# Patient Record
Sex: FEMALE | Race: BLACK OR AFRICAN AMERICAN | NOT HISPANIC OR LATINO | URBAN - METROPOLITAN AREA
[De-identification: names, ages, dates, MRNs, and addresses within clinical notes are randomized per-mention and may not be internally consistent; named-entity substitution may affect disease eponyms.]

---

## 2023-09-26 ENCOUNTER — EMERGENCY (EMERGENCY)
Facility: HOSPITAL | Age: 33
LOS: 1 days | Discharge: HOME | End: 2023-09-26
Attending: STUDENT IN AN ORGANIZED HEALTH CARE EDUCATION/TRAINING PROGRAM
Payer: MEDICAID

## 2023-09-26 VITALS
RESPIRATION RATE: 20 BRPM | OXYGEN SATURATION: 100 % | HEIGHT: 65 IN | WEIGHT: 132.06 LBS | TEMPERATURE: 97 F | DIASTOLIC BLOOD PRESSURE: 87 MMHG | SYSTOLIC BLOOD PRESSURE: 131 MMHG | HEART RATE: 62 BPM

## 2023-09-26 VITALS
TEMPERATURE: 98 F | SYSTOLIC BLOOD PRESSURE: 121 MMHG | HEART RATE: 88 BPM | RESPIRATION RATE: 20 BRPM | OXYGEN SATURATION: 100 % | DIASTOLIC BLOOD PRESSURE: 75 MMHG

## 2023-09-26 PROCEDURE — 99283 EMERGENCY DEPT VISIT LOW MDM: CPT | Mod: 25

## 2023-09-26 PROCEDURE — 96372 THER/PROPH/DIAG INJ SC/IM: CPT

## 2023-09-26 PROCEDURE — 99284 EMERGENCY DEPT VISIT MOD MDM: CPT

## 2023-09-26 RX ORDER — ACETAMINOPHEN 500 MG
975 TABLET ORAL ONCE
Refills: 0 | Status: COMPLETED | OUTPATIENT
Start: 2023-09-26 | End: 2023-09-26

## 2023-09-26 RX ORDER — KETOROLAC TROMETHAMINE 30 MG/ML
30 SYRINGE (ML) INJECTION ONCE
Refills: 0 | Status: DISCONTINUED | OUTPATIENT
Start: 2023-09-26 | End: 2023-09-26

## 2023-09-26 RX ORDER — CYCLOBENZAPRINE HYDROCHLORIDE 10 MG/1
10 TABLET, FILM COATED ORAL ONCE
Refills: 0 | Status: COMPLETED | OUTPATIENT
Start: 2023-09-26 | End: 2023-09-26

## 2023-09-26 RX ORDER — LIDOCAINE 4 G/100G
1 CREAM TOPICAL ONCE
Refills: 0 | Status: COMPLETED | OUTPATIENT
Start: 2023-09-26 | End: 2023-09-26

## 2023-09-26 RX ORDER — CYCLOBENZAPRINE HYDROCHLORIDE 10 MG/1
1 TABLET, FILM COATED ORAL
Qty: 9 | Refills: 0
Start: 2023-09-26 | End: 2023-09-28

## 2023-09-26 RX ADMIN — LIDOCAINE 1 PATCH: 4 CREAM TOPICAL at 15:29

## 2023-09-26 RX ADMIN — CYCLOBENZAPRINE HYDROCHLORIDE 10 MILLIGRAM(S): 10 TABLET, FILM COATED ORAL at 15:28

## 2023-09-26 RX ADMIN — Medication 30 MILLIGRAM(S): at 15:38

## 2023-09-26 RX ADMIN — Medication 975 MILLIGRAM(S): at 15:27

## 2023-09-26 RX ADMIN — Medication 30 MILLIGRAM(S): at 16:08

## 2023-09-26 RX ADMIN — Medication 975 MILLIGRAM(S): at 15:57

## 2023-09-26 NOTE — ED ADULT TRIAGE NOTE - CHIEF COMPLAINT QUOTE
Rt side neck pain, Rt shoulder, Rt arm numbness after stretching and heard a pop 45 mins ago [Negative] : Heme/Lymph

## 2023-09-26 NOTE — ED PROVIDER NOTE - NSFOLLOWUPINSTRUCTIONS_ED_ALL_ED_FT
You were seen in the emergency department for neck pain.    Please follow-up with your primary care doctor within the next 48 hours.     Please take ibuprofen, as prescribed on the box for pain control.    If you have any worsening symptoms, severe headache, dizziness, severe chest pain, trouble breathing, please return to the emergency department. You were seen in the emergency department for neck pain.    Please follow-up with your primary care doctor within the next 48 hours.     You can use 400-600mg Ibuprofen (such as motrin or advil) every 6 to 8 hours as needed for pain control.  Take ibuprofen with food or milk to lessen stomach upset.  This is an over-the-counter medication please respect the warnings on the label. All medications come with certain risks and side effects that you need to discuss with your doctor, especially if you are taking them for a prolonged period.    You can use 500-1000mg Tylenol every 6 hours for pain - as needed.  This is an over-the-counter medications - please respect the warnings on the label. This medication come with certain risks and side effects that you need to discuss with your doctor, especially if you are taking it for a prolonged period.    Muscle relaxants are sent to your pharmacy - this medication can make you drowsy. Please do not drive, use heavy machinery, or use any sharp objects after taking this medication. This medication increases your risk of falling - if you are concerned please only take this medication at night before going to sleep.    If you have any worsening symptoms, severe headache, dizziness, severe chest pain, trouble breathing, please return to the emergency department.    Muscle Strain  A muscle strain, or pulled muscle, happens when a muscle is stretched beyond its normal length. This can tear some muscle fibers and cause pain.    Usually, it takes 1–2 weeks to heal from a muscle strain. Full healing normally takes 5–6 weeks.    What are the causes?  This condition is caused when a sudden force is placed on a muscle and stretches it too far. This can happen with a fall, while lifting, or during sports.    What increases the risk?  You are more likely to develop a muscle strain if you are an athlete or you do a lot of physical activity.    What are the signs or symptoms?  Pain.  Tenderness.  Bruising.  Swelling.  Trouble using the muscle.  How is this treated?  This condition is first treated with PRICE therapy. This involves:  Protecting your muscle from being injured again.  Resting your injured muscle.  Icing your injured muscle.  Putting pressure (compression) on your injured muscle. This may be done with a splint or elastic bandage.  Raising (elevating) your injured muscle.  Your doctor may also recommend medicine for pain.    Follow these instructions at home:  If you have a splint that can be taken off:    Wear the splint as told by your doctor. Take it off only as told by your doctor.  Check the skin around the splint every day. Tell your doctor if you see problems.  Loosen the splint if your fingers or toes:  Tingle.  Become numb.  Turn cold and blue.  Keep the splint clean.  If the splint is not waterproof:  Do not let it get wet.  Cover it with a watertight covering when you take a bath or a shower.  Managing pain, stiffness, and swelling    Bag of ice on a towel on the skin.   If told, put ice on your injured area. To do this:  If you have a removable splint, take it off as told by your doctor.  Put ice in a plastic bag.  Place a towel between your skin and the bag.  Leave the ice on for 20 minutes, 2–3 times a day.  Take off the ice if your skin turns bright red. This is very important. If you cannot feel pain, heat, or cold, you have a greater risk of damage to the area.  Move your fingers or toes often.  Raise the injured area above the level of your heart while you are sitting or lying down.  Wear an elastic bandage as told by your doctor. Make sure it is not too tight.  General instructions    Take over-the-counter and prescription medicines only as told by your doctor. This may include:  Medicines for pain and swelling that are taken by mouth or put on the skin.  Medicines to help relax your muscles.  Limit your activity. Rest your injured muscle as told by your doctor. Your doctor may say that gentle movements are okay.  If physical therapy was prescribed, do exercises as told by your doctor.  Do not put pressure on any part of the splint until it is fully hardened. This may take many hours.  Do not smoke or use any products that contain nicotine or tobacco. If you need help quitting, ask your doctor.  Ask your doctor when it is safe to drive if you have a splint.  Keep all follow-up visits.  How is this prevented?  Warm up before you exercise. This helps to prevent more muscle strains.    Contact a doctor if:  You have more pain or swelling in the injured area.  Get help right away if:  You have any of these problems in your injured area:  Numbness.  Tingling.  Less strength than normal.  Summary  A muscle strain is an injury that happens when a muscle is stretched beyond normal length.  This condition is first treated with PRICE therapy. This includes protecting, resting, icing, adding pressure, and raising your injury.  Limit your activity. Rest your injured muscle as told by your doctor. Your doctor may say that gentle movements are okay.  Warm up before you exercise. This helps to prevent more muscle strains.

## 2023-09-26 NOTE — ED PROVIDER NOTE - PATIENT PORTAL LINK FT
You can access the FollowMyHealth Patient Portal offered by [facility] by registering at the following website: [email or web address]. By joining [employer]’s FollowMyHealth portal, you will also be able to view your health information using other applications (apps) compatible with our system.

## 2023-09-26 NOTE — ED PROVIDER NOTE - CLINICAL SUMMARY MEDICAL DECISION MAKING FREE TEXT BOX
32-year-old female presenting with neck pain.  Full strength in upper extremities.  Symptoms likely secondary to MSK pain versus radiculopathy.  Low concern for bony injury such as fracture as mechanism was low.

## 2023-09-26 NOTE — ED PROVIDER NOTE - OBJECTIVE STATEMENT
32-year-old female, no significant PMH, presenting with neck pain.  Patient reports she had sudden onset neck pain while she was stretching her arms.  Reports she had pain down her neck and had some numbness in her arms.  No weakness or tingling in her arms.  Since then she has not been able to move her neck.

## 2023-09-26 NOTE — ED PROVIDER NOTE - PHYSICAL EXAMINATION
General: uncomfortable appearing female, no acute distress   HEENT: cervical midline spinal tenderness to palpation    Respiratory: normal work of breathing  MSK: no swelling or tenderness of lower extremities, moving all extremities spontaneously   Skin: warm, dry   Neuro: A&Ox3, 5/5 strength in all extremities no pronator drift   Psych: appropriate affect

## 2023-09-26 NOTE — ED PROVIDER NOTE - PROGRESS NOTE DETAILS
Enrrique Crump DO: Patient received at sign out. Pending tests/tentative plan: reassessment after the medications. Pt states the pain is improved. Now only on RT side of the neck and gets worse when she is turning to RT side. Equal strength and sensation in upper extremities. No new complains. +ttp of RT cervical paraspinal region. stable for dc at this time.

## 2023-09-26 NOTE — ED ADULT NURSE NOTE - NSFALLRISKINTERV_ED_ALL_ED

## 2023-09-26 NOTE — ED ADULT NURSE NOTE - OBJECTIVE STATEMENT
Pt states was sitting in a chair and stretched , when she felt a noise /crack in her neck and had developed pain to right neck and Rt arm

## 2023-10-03 NOTE — ED ADULT TRIAGE NOTE - LOCATION:
Right arm; Left arm; Rifampin Pregnancy And Lactation Text: This medication is Pregnancy Category C and it isn't know if it is safe during pregnancy. It is also excreted in breast milk and should not be used if you are breast feeding.